# Patient Record
(demographics unavailable — no encounter records)

---

## 2025-03-16 NOTE — ASSESSMENT
[FreeTextEntry1] : 40 yo male desires vasectomy.  We discussed in detail the procedure of vasectomy, including the indications, the risks, the alternatives and the chances for success, including:  - he should consider this procedure permanent - options for reversing the sterility include: microsurgical vasovasostomy, testicular sperm extraction, sperm banking prior to the vasectomy -potential side effects include: pain, fever, swelling, bleeding, infection, numbness, testicular atrophy. - a sperm counts showing 0 sperm is required after the procedure before he will be allowed to rely upon the vasectomy for birth control - no blood thinners 1 week prior to the procedure - unlikely relationship between the vasectomy and any other health issues moving forward - 99+ % success rate from the procedure.  We also reviewed the information below and a copy of this information was sent to the patient with the appropriate consent forms.   A vasectomy is a procedure performed in a man for the purpose of preventing future pregnancies . This procedure involves cutting and tying the two tubes (vas deferens) which carry the sperm from the testicles during ejaculation. After an initial consultation and counseling session the procedure is scheduled. It can usually be performed in the office under local anesthesia through one small incision in the scrotum (sack).  Afterwards, you can usually return to work in a day or two.  Most people find that any pain they may feel is relieved by plain Tylenol and/or ibuprofen. You will be seen in the office for follow-up 2 weeks after the procedure.  A vasectomy should be considered an IRREVERSIBLE form of birth control since methods used to reverse it are expensive and are not always successful. There are several reversible methods which should be considered if there is a question about the desire to have more children in the future. If there is no question, then a vasectomy is a good choice for permanent birth control. It is easier than a tubal ligation in the female. It has a very high rate of success (greater than 99%). The complication rate is very low.  Before you can rely on the vasectomy for birth control, you must produce a semen sample that shows ZERO SPERM. This is usually best obtained after about 3 months.  In spite of these precautions, there is a very small chance (less than I %) that you could still father a child. The vasectomy is performed only for birth control and offers no protection against mami sexually transmitted diseases, such as, Gonorrhea, Syphilis or AIDS. There has been some controversy in the past linking vasectomy to heart disease or prostate cancer. However, several large scale studies have not shown this to be true, our office can provide you with information and/or references on this topic, if you wish.  To summarize, the vasectomy is a safe, reliable and easy method to obtain permanent sterilization.     I explained that as per [facility] law he will need to sign a 2 part consent at least 30 days apart.   1. Schedule vasectomy 2. Pt to sign [place] consent today

## 2025-03-16 NOTE — PHYSICAL EXAM
[General Appearance - Well Developed] : well developed [Normal Appearance] : normal appearance [Edema] : no peripheral edema [] : no respiratory distress [Abdomen Soft] : soft [Abdomen Tenderness] : non-tender [Abdomen Hernia] : no hernia was discovered [Urethral Meatus] : meatus normal [Epididymis] : the epididymides were normal [Testes Tenderness] : no tenderness of the testes [Testes Mass (___cm)] : there were no testicular masses [Normal Station and Gait] : the gait and station were normal for the patient's age [Skin Color & Pigmentation] : normal skin color and pigmentation [No Focal Deficits] : no focal deficits [Oriented To Time, Place, And Person] : oriented to person, place, and time [Not Anxious] : not anxious [de-identified] : B/L vasa palpated

## 2025-03-16 NOTE — ASSESSMENT
[FreeTextEntry1] : 40 yo male desires vasectomy.  We discussed in detail the procedure of vasectomy, including the indications, the risks, the alternatives and the chances for success, including:  - he should consider this procedure permanent - options for reversing the sterility include: microsurgical vasovasostomy, testicular sperm extraction, sperm banking prior to the vasectomy -potential side effects include: pain, fever, swelling, bleeding, infection, numbness, testicular atrophy. - a sperm counts showing 0 sperm is required after the procedure before he will be allowed to rely upon the vasectomy for birth control - no blood thinners 1 week prior to the procedure - unlikely relationship between the vasectomy and any other health issues moving forward - 99+ % success rate from the procedure.  We also reviewed the information below and a copy of this information was sent to the patient with the appropriate consent forms.   A vasectomy is a procedure performed in a man for the purpose of preventing future pregnancies . This procedure involves cutting and tying the two tubes (vas deferens) which carry the sperm from the testicles during ejaculation. After an initial consultation and counseling session the procedure is scheduled. It can usually be performed in the office under local anesthesia through one small incision in the scrotum (sack).  Afterwards, you can usually return to work in a day or two.  Most people find that any pain they may feel is relieved by plain Tylenol and/or ibuprofen. You will be seen in the office for follow-up 2 weeks after the procedure.  A vasectomy should be considered an IRREVERSIBLE form of birth control since methods used to reverse it are expensive and are not always successful. There are several reversible methods which should be considered if there is a question about the desire to have more children in the future. If there is no question, then a vasectomy is a good choice for permanent birth control. It is easier than a tubal ligation in the female. It has a very high rate of success (greater than 99%). The complication rate is very low.  Before you can rely on the vasectomy for birth control, you must produce a semen sample that shows ZERO SPERM. This is usually best obtained after about 3 months.  In spite of these precautions, there is a very small chance (less than I %) that you could still father a child. The vasectomy is performed only for birth control and offers no protection against mami sexually transmitted diseases, such as, Gonorrhea, Syphilis or AIDS. There has been some controversy in the past linking vasectomy to heart disease or prostate cancer. However, several large scale studies have not shown this to be true, our office can provide you with information and/or references on this topic, if you wish.  To summarize, the vasectomy is a safe, reliable and easy method to obtain permanent sterilization.     I explained that as per [facility] law he will need to sign a 2 part consent at least 30 days apart.   1. Schedule vasectomy 2. Pt to sign [place] consent today

## 2025-03-16 NOTE — PHYSICAL EXAM
[Normal Appearance] : normal appearance [General Appearance - Well Developed] : well developed [Edema] : no peripheral edema [] : no respiratory distress [Abdomen Soft] : soft [Abdomen Tenderness] : non-tender [Abdomen Hernia] : no hernia was discovered [Urethral Meatus] : meatus normal [Epididymis] : the epididymides were normal [Testes Tenderness] : no tenderness of the testes [Testes Mass (___cm)] : there were no testicular masses [Normal Station and Gait] : the gait and station were normal for the patient's age [Skin Color & Pigmentation] : normal skin color and pigmentation [No Focal Deficits] : no focal deficits [Not Anxious] : not anxious [Oriented To Time, Place, And Person] : oriented to person, place, and time [de-identified] : B/L vasa palpated